# Patient Record
Sex: MALE | URBAN - METROPOLITAN AREA
[De-identification: names, ages, dates, MRNs, and addresses within clinical notes are randomized per-mention and may not be internally consistent; named-entity substitution may affect disease eponyms.]

---

## 2021-04-05 ENCOUNTER — OFFICE VISIT (OUTPATIENT)
Dept: URBAN - METROPOLITAN AREA CLINIC 75 | Facility: CLINIC | Age: 86
End: 2021-04-05
Payer: COMMERCIAL

## 2021-04-05 DIAGNOSIS — H26.493 OTHER SECONDARY CATARACT, BILATERAL: ICD-10-CM

## 2021-04-05 DIAGNOSIS — H52.223 REGULAR ASTIGMATISM, BILATERAL: Primary | ICD-10-CM

## 2021-04-05 DIAGNOSIS — H35.371 PUCKERING OF MACULA, RIGHT EYE: ICD-10-CM

## 2021-04-05 PROCEDURE — 92004 COMPRE OPH EXAM NEW PT 1/>: CPT | Performed by: OPTOMETRIST

## 2021-04-05 ASSESSMENT — INTRAOCULAR PRESSURE
OD: 16
OS: 12

## 2021-04-05 ASSESSMENT — VISUAL ACUITY
OD: 20/25
OS: 20/60

## 2021-04-05 NOTE — IMPRESSION/PLAN
Impression: Puckering of macula, right eye: H35.371. Plan: Epiretinal membranes do not usually require treatment, unless distorted vision or blurry vision occur. The main treatment consists of vitrectomy surgery with peeling off the ERM. An ERM can occur from aging, previous eye trauma, or chronic eye inflammation (such as uveitis). Contact office if you experience a loss of vision or distortion.